# Patient Record
Sex: MALE | Race: WHITE | Employment: FULL TIME | URBAN - METROPOLITAN AREA
[De-identification: names, ages, dates, MRNs, and addresses within clinical notes are randomized per-mention and may not be internally consistent; named-entity substitution may affect disease eponyms.]

---

## 2021-02-05 ENCOUNTER — DOCUMENTATION ONLY (OUTPATIENT)
Dept: ONCOLOGY | Facility: CLINIC | Age: 61
End: 2021-02-05

## 2021-02-05 ENCOUNTER — TRANSCRIBE ORDERS (OUTPATIENT)
Dept: OTHER | Age: 61
End: 2021-02-05

## 2021-02-05 DIAGNOSIS — C80.1: Primary | ICD-10-CM

## 2021-02-05 DIAGNOSIS — C78.00: Primary | ICD-10-CM

## 2021-02-05 NOTE — PROGRESS NOTES
Action    Action Taken 2/5/21: Records pulled thru CE    -[employer] Count includes the [facility] - RAD: [phone]    2/10/21: img disc rec'd from [facility], taken for upload  1:15 PM    -[employer] [facility]ell[facility] [place] - Path: [id number]    Faxed request for Recs from Dr. Brianda Pinzon/[facility]: [phone] (fax)    2/19/21:    -Rec'd call from Dr. Pinzon office, they require an ONEAL to release recs.    -LVM for pt re: ONEAL  1:30 PM       RECORDS STATUS - ALL OTHER DIAGNOSIS      RECORDS RECEIVED FROM:[facility]   DATE RECEIVED:    NOTES STATUS DETAILS   OFFICE NOTE from referring provider [facility] Dr. Damian Barrett: 1/28/21   OFFICE NOTE from medical oncologist     DISCHARGE SUMMARY from hospital     DISCHARGE REPORT from the ER     OPERATIVE REPORT  - [facility] - Bronchoscopy   MEDICATION LIST     CLINICAL TRIAL TREATMENTS TO DATE     LABS     PATHOLOGY REPORTS [facility], Report in CE, Slides requested 2/11/21: [id number]   ANYTHING RELATED TO DIAGNOSIS Epic/CE 1/22/21   GENONOMIC TESTING     TYPE:     IMAGING (NEED IMAGES & REPORT)     XR  Requested 2/5 1/15/21: [facility]   CT SCANS Requested 2/5 1/27/21: [facility]   MRI Requested 2/5 1/20/21: [facility]   MAMMO     ULTRASOUND     PET

## 2021-02-08 ENCOUNTER — PATIENT OUTREACH (OUTPATIENT)
Dept: ONCOLOGY | Facility: CLINIC | Age: 61
End: 2021-02-08

## 2021-02-08 NOTE — PROGRESS NOTES
New Patient Oncology Nurse Navigator Note     Referring provider: Dr. Damian Barrett, Oncology    Referring Clinic/Organization: [facility], [place], [place]     Referred to: Medical Oncology    Requested provider (if applicable): Dr. Ferris    Referral Received: 02/08/21       Evaluation for : metastatic cancer, suspect Renal Cell Carcinoma primary     Clinical History (per Nurse review of records provided):      * 1/27/21 CT  CAP shows abnormal kidney fullness (right), lytic lesions in lumbar, pulmonary nodules, subcarinal LNs, suspicious for metastatic disease    Pt will get EBUS 2/11 to biopsy lung nodules and obtain tissue diagnosis. He will see Rad Onc for RT to left femur. May see Urology to consider cytoreductive nephrectomy.     Local Onc recommends that pt seek consult with  expert, Dr Ferris.     Clinical Assessment / Barriers to Care (Per Nurse):    I called pt's cell and left a msg to call me directly. My # provided.    Plan to discuss pt's referral to Dr Ferris. A virtual consult was mentioned in pt's chart, but Dr Ferris is only licensed in [place] and [place], not [place]. Pt would need to physically be in [place] or [place] to complete virtual consult, or pt may come to our [place] clinic.       Addendum 2/8: I spoke to pt, he is aware that he needs to physically be in [place] or [place] during video visit with Dr Ferris. He is agreeable to video visit with Dr Ferris on Monday March 8th at 11am central time. He has family in [place], and may visit them on 3/8 in preparation for the video with Dr Ferris.    Pt states that he met with Dr Brianda Benson at [facility] to discuss RT to left femur. They plan 10 fracs 2/9-2/23.     Will obtain RT records as well as upcoming bronch. Pt agrees & verbalizes understanding of this plan. He has my direct# PRN.    Records Location: Care Everywhere     Records Needed:     All imaging & path slides at [place]     Additional testing needed prior to  consult:     NATALIE Crowley, RN, BSN, OCN  Oncology New Patient Nurse Navigator   [facility]  [phone]

## 2021-02-09 ENCOUNTER — TELEPHONE (OUTPATIENT)
Dept: ONCOLOGY | Facility: CLINIC | Age: 61
End: 2021-02-09

## 2021-02-09 NOTE — TELEPHONE ENCOUNTER
I called the patient and left a voicemail for him to call back to finish registration. Pt confirmed appt with the clinic however I called to get insurance info and complete his chart info. When he calls just complete the registration part. Thanks

## 2021-02-09 NOTE — TELEPHONE ENCOUNTER
ONCOLOGY INTAKE: Records Information      APPT INFORMATION:  Referring provider:  Dr. 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 Yes    Has patient been seen for any external appt for this diagnosis? Yes    If yes, where? [facility]  Has patient had any imaging or procedures outside of [place] for this condition? Yes      If Yes, where? [facility]      ADDITIONAL INFORMATION:  Pt will travel for appointment

## 2021-02-25 NOTE — TELEPHONE ENCOUNTER
Action 2/25/2021 2:50pm [facility]   Action Taken Received Bx ([facility])

## 2021-02-26 ENCOUNTER — PATIENT OUTREACH (OUTPATIENT)
Dept: ONCOLOGY | Facility: CLINIC | Age: 61
End: 2021-02-26

## 2021-02-26 PROCEDURE — 999N001032 HC STATISTIC REVIEW OUTSIDE SLIDES TC 88321: Performed by: INTERNAL MEDICINE

## 2021-02-26 PROCEDURE — 88321 CONSLTJ&REPRT SLD PREP ELSWR: CPT | Mod: 26 | Performed by: PATHOLOGY

## 2021-02-26 NOTE — PROGRESS NOTES
I spoke to pt's RN (Sammy) at [facility]; pt has experienced left femur fracture on 2/23. He is currently hospitalized & will not be recovered in time to travel to [facility] for his video visit with Dr Ferris on 3/8/21. We will cancel his appt with Dr Ferris for now; I provided Sammy with my direct# if pt would like to reschedule the appt for a future date.     Sammy states that she has had a conversation with pt previously and both agreed he may not be well enough to travel for several weeks. Sammy will reiterate the 3/8 cancellation to the pt when she speaks to him today.

## 2021-03-01 LAB — COPATH REPORT: NORMAL

## 2021-03-08 ENCOUNTER — PRE VISIT (OUTPATIENT)
Facility: CLINIC | Age: 61
End: 2021-03-08